# Patient Record
Sex: FEMALE
[De-identification: names, ages, dates, MRNs, and addresses within clinical notes are randomized per-mention and may not be internally consistent; named-entity substitution may affect disease eponyms.]

---

## 2018-09-02 ENCOUNTER — HOSPITAL ENCOUNTER (EMERGENCY)
Dept: HOSPITAL 68 - ERH | Age: 28
End: 2018-09-02
Payer: COMMERCIAL

## 2018-09-02 VITALS — WEIGHT: 236 LBS | HEIGHT: 67 IN | BODY MASS INDEX: 37.04 KG/M2

## 2018-09-02 VITALS — DIASTOLIC BLOOD PRESSURE: 99 MMHG | SYSTOLIC BLOOD PRESSURE: 128 MMHG

## 2018-09-02 DIAGNOSIS — R53.1: ICD-10-CM

## 2018-09-02 DIAGNOSIS — J02.9: ICD-10-CM

## 2018-09-02 DIAGNOSIS — J30.89: Primary | ICD-10-CM

## 2018-09-02 LAB
ABSOLUTE GRANULOCYTE CT: 2.2 /CUMM (ref 1.4–6.5)
BASOPHILS # BLD: 0 /CUMM (ref 0–0.2)
BASOPHILS NFR BLD: 0.4 % (ref 0–2)
EOSINOPHIL # BLD: 0.1 /CUMM (ref 0–0.7)
EOSINOPHIL NFR BLD: 2.4 % (ref 0–5)
ERYTHROCYTE [DISTWIDTH] IN BLOOD BY AUTOMATED COUNT: 13.1 % (ref 11.5–14.5)
GRANULOCYTES NFR BLD: 51.9 % (ref 42.2–75.2)
HCT VFR BLD CALC: 39.7 % (ref 37–47)
LYMPHOCYTES # BLD: 1.6 /CUMM (ref 1.2–3.4)
MCH RBC QN AUTO: 29 PG (ref 27–31)
MCHC RBC AUTO-ENTMCNC: 34.1 G/DL (ref 33–37)
MCV RBC AUTO: 85 FL (ref 81–99)
MONOCYTES # BLD: 0.3 /CUMM (ref 0.1–0.6)
PLATELET # BLD: 265 /CUMM (ref 130–400)
PMV BLD AUTO: 7.5 FL (ref 7.4–10.4)
RED BLOOD CELL CT: 4.67 /CUMM (ref 4.2–5.4)
WBC # BLD AUTO: 4.2 /CUMM (ref 4.8–10.8)

## 2018-09-02 NOTE — ED GENERAL ADULT
History of Present Illness
 
General
Chief Complaint: General Adult
Stated Complaint: PT STATES 'I HAVE MOLD POISONING/DIZZY,TURNER,RASH"
Source: patient
Exam Limitations: no limitations
 
Vital Signs & Intake/Output
Vital Signs & Intake/Output
 Vital Signs
 
 
Date Time Temp Pulse Resp B/P B/P Pulse O2 O2 Flow FiO2
 
     Mean Ox Delivery Rate 
 
09/02 1253       Room Air  
 
09/02 1146  80  128/99     
 
09/02 0936 97.1 90 16 132/87  98 Room Air  
 
 
 
Allergies
Coded Allergies:
No Known Allergies (09/02/18)
 
Reconcile Medications
Albuterol Sulfate (Proair Hfa) 90 MCG HFA.AER.AD   2 PUF INH Q4-6 PRN PRN COUGH/
WHEEZING 
Fluticasone Propionate (Flonase Allergy Relief) 50 MCG/ACTUATION SPRAY.SUSP   1 
SPRAY NASB ONCE DAILY PRN CONGESTION/ALLERGY
Loratadine (Claritin) 10 MG CAPSULE   1 CAP PO ONCE DAILY PRN ALLERGY
Olopatadine HCl (Pataday) 0.2 % DROPS   1 GTT OPH DAILY PRN EYYE IRRITATION 
 
Triage Note:
PT STATES THAT SHE HAS BEEN LIVING IN HER
APARTMENT FOR 2 YEARS AND THERE IS A MOLD PROBLEM,
HOUSE IS BEING SOLD AND SHE IS MOVING AND THAT SHE
WOULD LIKE TO BE TESTED FOR DIFFERENT TYPES OF
MOLD DUE TO SHE HAS BEEN DIAGNOSED WITH
FIBROMYALGIA AND OTHER PROBLEMS SINCE LIVING
THERE. DENIES SOB, O2 SAT 98 % ON RA
Triage Nurses Notes Reviewed? yes
Onset: Abrupt
Duration: week(s): (6MONTHS-1 YEAR), continues in ED, getting worse
Timing: single episode today
Injury Environment: home
Severity: mild, moderate
Pregnant: No
Patient currently breastfeeds: No
HPI:
27-year-old female history of fibromyalgia presents for evaluation of fatigue, 
weakness, congestion, sore throat and itchy eyes.  Patient reports she recently 
found out that she has a lot of mold in her apartment.  She lives in a basement.
 She states since she moved into department 2 years ago she has had worsening 
health problems.  She reports she frequently will get sick she frequently feels 
weak and fatigued.  She reports that she frequently will also have itchy eyes 
with watery eyes and nasal congestion and a sore throat.  She denies any 
medicine for this.  She's not been tested for mold allergies.  She denies any 
chest pain or shortness of breath fevers nausea vomiting diarrhea or abdominal 
pain or urinary symptoms.  No rashes.
(Wily Rendon)
 
Past History
 
Travel History
Traveled to [place] past 21 day No
 
Medical History
Any Pertinent Medical History? see below for history
Neurological: NONE
EENT: NONE
Cardiovascular: NONE
Respiratory: asthma
Hepatic: NONE
Renal: NONE
Musculoskeletal: fibromyalgia
Endocrine: Hashimoto's thyroiditis
Cancer(s): NONE
GYN/Reproductive: NONE
 
Surgical History
Surgical History: non-contributory
 
Psychosocial History
What is your primary language English
Tobacco Use: Never used
ETOH Use: denies use
Illicit Drug Use: denies illicit drug use
 
Family History
Hx Contributory? No
(Wily Rendon)
 
Review of Systems
 
Review of Systems
Constitutional:
Reports: malaise, weakness. 
EENTM:
Reports: eye tearing, nasal congestion, throat pain. 
Respiratory:
Reports: no symptoms. 
Cardiovascular:
Reports: no symptoms. 
GI:
Reports: no symptoms. 
Genitourinary:
Reports: no symptoms. 
Musculoskeletal:
Reports: no symptoms. 
Skin:
Reports: no symptoms. 
Neurological/Psychological:
Reports: no symptoms. 
Hematologic/Endocrine:
Reports: no symptoms. 
Immunologic/Allergic:
Reports: no symptoms. 
All Other Systems: Reviewed and Negative
(Wily Rendon)
 
Physical Exam
 
Physical Exam
General Appearance: well developed/nourished, no apparent distress, alert, awake
Head: atraumatic, normal appearance
Eyes:
Bilateral: normal appearance, PERRL, EOMI. 
Ears, Nose, Throat: normal pharynx, normal ENT inspection, hearing grossly 
normal
Neck: normal inspection, supple, full range of motion
Respiratory: normal breath sounds, chest non-tender, no respiratory distress, 
lungs clear
Cardiovascular: regular rate/rhythm, normal peripheral pulses
Peripheral Pulses:
2+ radial (R), 2+ radial (L)
Gastrointestinal: soft, non-tender
Back: normal inspection, normal range of motion, no vertebral tenderness
Extremities: normal inspection, normal range of motion, no edema
Neurologic/Psych: no motor/sensory deficits, awake, alert, oriented x 3, normal 
gait, normal mood/affect
Skin: intact, normal color, warm/dry
Lymphatic: no anterior cervical monalisa
 
Core Measures
ACS in differential dx? No
CVA/TIA Diagnosis: No
Sepsis Present: No
Sepsis Focused Exam Completed? No
(Wily Rendon)
 
Progress
Differential Diagnoses
I considered the following diagnoses in my evaluation of the patient: [Allergic 
rhinitis, mold allergy, viral syndrome, fibromyalgia, electrolyte abnormality, 
dehydration]
 
Plan of Care:
 Orders
 
 
Procedure Date/time Status
 
URINE PREGNANCY 09/02 0934 Complete
 
URINALYSIS 09/02 0934 Complete
 
TROPONIN LEVEL 09/02 0934 Complete
 
COMPREHENSIVE METABOLIC PANEL 09/02 0934 Complete
 
CBC WITHOUT DIFFERENTIAL 09/02 0934 Complete
 
 
 Laboratory Tests
 
 
 
09/02/18 1120:
Urine Color YEL, Urine Clarity CLEAR, Urine pH 6.0, Ur Specific Gravity 1.025, 
Urine Protein NEG, Urine Ketones >=80, Urine Nitrite NEG, Urine Bilirubin NEG, 
Urine Urobilinogen 0.2, Ur Leukocyte Esterase NEG, Ur Microscopic SEDIMENT 
EXAMINED, Urine RBC 25-50  H, Ur Epithelial Cells FEW, Urine Bacteria RARE  H, 
Urine Hemoglobin LARGE  H, Urine Glucose NEG, Urine Pregnancy Test NEGATIVE
 
09/02/18 1013:
Anion Gap 7, Estimated GFR > 60, BUN/Creatinine Ratio 16.7, Glucose 82, Calcium 
9.6, Total Bilirubin 1.1, AST 24, ALT 32, Alkaline Phosphatase 54, Troponin I < 
0.01, Total Protein 7.3, Albumin 4.1, Globulin 3.2, Albumin/Globulin Ratio 1.3, 
CBC w Diff NO MAN DIFF REQ, RBC 4.67, MCV 85.0, MCH 29.0, MCHC 34.1, RDW 13.1, 
MPV 7.5, Gran % 51.9, Lymphocytes % 38.0, Monocytes % 7.3, Eosinophils % 2.4, 
Basophils % 0.4, Absolute Granulocytes 2.2, Absolute Lymphocytes 1.6, Absolute 
Monocytes 0.3, Absolute Eosinophils 0.1, Absolute Basophils 0
 
 
Disposition is here for evaluation of multiple complaints including weakness 
fatigue itchy watery eyes congestion and sore throat.  She recently found that 
she has mold in her apartment she thinks this is something do with it.  Physical
exam is within normal limits.  Vital signs are stable.  Blood work was obtained 
and is within normal limits.  Patient was given prescriptions for antihistamine 
eyedrops will antihistamines and Flonase.  Advised her to get a dehumidifier 
consider moving out of her apartment.  Have professional's removal mold.  Follow
-up with primary care doctor or allergist for further testing and treatment.  
Discussed return precautions patient agrees to plan
Initial ED EKG: none
(Wily Rendon)
 
Departure
 
Departure
Disposition: HOME OR SELF CARE
Condition: Stable
Clinical Impression
Primary Impression: Allergy to mold
Referrals:
Yaw Boggs MD (PCP/Family)
 
Additional Instructions:
Take loratadine once daily.  Flonase for nasal congestion or sore throat.  
Pataday eyedrops for eye irritation.  Get a dehumidifier for your basement.  
Make a follow-up APPT with your doctor OR ALLERGIST for allergy testing.  
Monitor your symptoms return with any concerns.
Departure Forms:
Customer Survey
General Discharge Information
Prescriptions:
Current Visit Scripts
Loratadine (Claritin) 1 CAP PO ONCE DAILY PRN ALLERGY 
     #30 CAP 
 
Fluticasone Propionate (Flonase Allergy Relief) 1 SPRAY NASB ONCE DAILY PRN 
CONGESTION/ALLERGY 
     #1 BOT 
 
Olopatadine HCl (Pataday) 1 GTT OPH DAILY PRN EYYE IRRITATION  
     #1 BOT 
 
Albuterol Sulfate (Proair Hfa) 2 PUF INH Q4-6 PRN PRN COUGH/WHEEZING  
     #1 INHAL 
 
 
(Wily Rendon)
 
PA/NP Co-Sign Statement
Statement:
ED Attending supervision documentation-
 
[] I saw and evaluated the patient. I have also reviewed all the pertinent lab 
results and diagnostic results. I agree with the findings and the plan of care 
as documented in the PA's/NP's documentation. 
 
[x] I have reviewed the ED Record and agree with the PA's/NP's documentation.
 
[] Additions or exceptions (if any) to the PAs/NP's note and plan are 
summarized below:
[]
 
(Raza Coleman DO)
 
Critical Care Note
 
Critical Care Note
Critical Care Time: non-applicable
(Black PA,Wily)